# Patient Record
Sex: FEMALE | URBAN - METROPOLITAN AREA
[De-identification: names, ages, dates, MRNs, and addresses within clinical notes are randomized per-mention and may not be internally consistent; named-entity substitution may affect disease eponyms.]

---

## 2021-08-03 ENCOUNTER — VIRTUAL VISIT (OUTPATIENT)
Dept: PEDIATRIC NEUROLOGY | Facility: CLINIC | Age: 21
End: 2021-08-03
Attending: GENETIC COUNSELOR, MS
Payer: COMMERCIAL

## 2021-08-03 DIAGNOSIS — Z84.89 FAMILY HISTORY OF GENETIC DISEASE: Primary | ICD-10-CM

## 2021-08-03 DIAGNOSIS — Z82.0 FAMILY HISTORY OF CHARCOT-MARIE-TOOTH DISEASE: ICD-10-CM

## 2021-08-03 PROCEDURE — 96040 HC GENETIC COUNSELING, EACH 30 MINUTES: CPT | Mod: 95 | Performed by: GENETIC COUNSELOR, MS

## 2021-08-03 NOTE — PROGRESS NOTES
Beti is a 21 year old who is being evaluated via a billable video visit.      How would you like to obtain your AVS? Mail a copy  If the video visit is dropped, the invitation should be resent by: Send to e-mail at: [email or web address] Will anyone else be joining your video visit? No        Wilma Bowens M.A.

## 2021-09-10 NOTE — PROGRESS NOTES
Beti Leblanc was self-referred for a genetic counseling appointment given her family history of Charcot-Conchita-Tooth disease type X.    Pertinent Medical History: Beti is a 21 year old female with a history of  tripping over her own feet for the past 2 years, bad balance, ankle weakness, aching feet and ankles, and tiring of the muscles in her hands.  Joana reports that when she wears sandals it is hard to keep them on her feet.  Beti has not been evaluated by a neurologist.    Family History: A three generation pedigree was obtained today and scanned into the EMR. This family history is by patient report only and has not been verified with medical records except where noted. The following information is significant:   Beti has 1 brother named Jovanni Leblanc (age 19) who was found to have a likely pathogenic variant in the GJB1 gene in 2018 at the [facility] diagnostic laboratory.  This variant is called C.467T >G.  This confirms his diagnosis of CMTX.  Beti has 1 sister ( age 18) who is healthy and does not have symptoms of CMT.  Beti's mother has CMTX.  Christophes mother has 1 sister who has high arches, foot drop, and hand weakness that are all associated with CMT.  She has 1 son who has symptoms of CMT, 1 son who does not have symptoms of CMT, and 3 daughters who do not have symptoms of CMT.  And his mother has 1 brother who does not have symptoms of CMT and has 2 sons who do not have symptoms of CMT.  Beti has maternal grandfather has symptoms of CMT.  He has 1 brother who has CMT, 1 sister who does not have CMT, and one sister named Janeth Ramirez who has CMT.  Janeth has 1 son named Yoan Ramirez and 1 daughter named Dorothea STEPHANIENikky who is currently undergoing genetic testing for CMT. Christophes father has a history of high blood pressure but is otherwise healthy.  He has 1 sister who is alive and well.  Christophes father has 2 paternal half-brothers who are alive and well.  Their children are  also healthy.  Beti's paternal cousins are alive and well.  He has paternal grandfather has a history of high blood pressure but is otherwise healthy.  Beti's paternal grandmother has a history of high blood pressure but is otherwise healthy.  Beti's ancestry is [place].  Consanguinity was denied.    Discussion: Beti's family have a condition called Charcot Conchita Tooth Type 1X or CMT1X. This diagnosis has/has not been confirmed through genetic testing. CMT type 1 damages the myelin that coats our nerves. CMT type 1 can be diagnosed based on the results of the nerve conduction studies, family history and/or genetic testing. The subtype of CMT identified in the family is type X. This can only be determined through genetic testing. All individuals with CMT1X have a similar genetic change that causes this particular subtype.    CMT1X is the second most common type of CMT, accounting for 10% of all genetically defined CMT. CMT1X is a peripheral neuropathy that causes damage to the myelin in two types of the nerves, the motor nerves (involved in muscle movement) and the sensory nerves (involved in sensation or feeling). Damage to motor nerves causes muscle weakness and difficulty with muscle movement, especially in the hands and feet. This can lead to difficulty walking, writing, putting on jewelry and many other types of movement. Weakness in the small muscles of the foot can also lead to changes in the structure of the foot such as hammer toes or high/low arches. Damage to the sensory nerves can cause numbness, tingling and impaired balance, which can lead to fatigue.    CMT1X is highly variable within members of the same family. Individuals may not experience any symptoms, these symptoms may be mild, or they might begin as mild and gradually become more severe. Boys and men with CMTX generally develop the symptoms of CMTX between the ages of 5 and 20 and most develop symptoms prior to age 10. However, about  20% of men with this form of CMT have a later onset of symptoms. Unlike other type of CMT, individuals may experience weakness in their intrinsic hand muscles, especially their thenar (thumb) muscles, as their first symptom. Muscle weakness may be especially prominent in their thumbs and distal calf muscles.Additionally, there are many reports of stroke-like episodes that occur in men with CMTX prior to age 20. These episodes may involve inability to speak and/or muscle weakness on one side of the body which generally resolves without lasting damage but may reoccur. Almost all females with CMTX have atypical nerve conduction study results but some may never develop symptoms of this condition. Many females with CMTX have more mild symptoms that what is seen in males. In some studies, as many as 1/3 of females with CMTX have a more severe presentation and their symptoms progress similarly to males with this condition. Genetic testing cannot predict how mild or severe an individual's symptoms will be.    We reviewed the genetics and inheritance of CMTX. We all have DNA in every cell in our bodies that tells our bodies how to develop and function properly. Individual instructions in the DNA are called genes. Disease-causing variants (changes) in genes that stop the gene from working properly cause genetic diseases like CMTX.   DNA is stored in structures called chromosomes. We all have 46 chromosomes that come in 23 pairs. The first 22 pairs of chromosomes are called autosomal and are the same in males and females. The 23rd pair of chromosomes are called sex chromosomes and are different in males and females. Male sex chromosomes are XY while female sex chromosomes are XX.   The gene associated with CMTX is called GJB1 which is located on the X chromosome. Normally, this gene provides the instructions for building a protein called connexin 32. This protein plays an important role in the peripheral nervous system,  "although this role is not well understood.  Disease-causing changes in the GJB1 gene leads to damage in the myelin part of the nerves. This results in the symptoms of CMTX.    CMTX Inheritance:   Because women have two copies of the X chromosome, they have two copies of the GJB1 gene. Men only have one X chromosome so men only have one copy of the GJB1 gene. It is thought that women are most often either unaffected or has mild symptoms associated with CMTX because they have two copies of this gene and the body \"turns off\" the broken or changed copy. On the other hand, boys and men with CMTX from a change in the GJB1 gene develop symptoms because they do not have an extra copy of this gene.    For males with CMTX, they will pass the GJB1 gene change to all of their daughters and none of their sons. This means that all of their daughters will have CMTX and none of their sons will have this condition.    For females who are CMTX carriers, there is a 50% chance of passing on the GJB1 change in each pregnancy. Any daughter or son would have a 50% chance of having CMTX.    Genetic testing is available to any family member who would like to know if they are affected with this condition. Family members may contact me at [phone] with questions or to schedule an appointment.    Genetic testing:  Risks benefits and limitations of genetic testing were reviewed.  We discussed that Beti's genetic testing would only look for the specific genetic change that was found in her brother and other family members or any other disease causing changes in the GJB1 gene.  We discussed possible results for this testing including a positive, negative or uncertain result.  Beti expressed an excellent understanding of this information and decided to proceed with testing through [facility] no charge family follow-up program.    Plan:  1.   Genetic testing through [facility] no charge family follow-up program for the GJB1 gene change " identified in her brother.  A cheek swab kit will be sent to Beti at her [facility].  2. Return pending results of above testing  3. Contact information was provided should any questions arise in the future.     Milagro Fraga MS [facility]  Genetic Counselor  Division of Genetics and Metabolism  (p) [phone]  (f) [phone]     Total time spent in consultation with the family was approximately 36 minutes    Cc: No Letter

## 2021-10-07 ENCOUNTER — TELEPHONE (OUTPATIENT)
Dept: NEUROLOGY | Facility: CLINIC | Age: 21
End: 2021-10-07

## 2021-10-07 DIAGNOSIS — Z82.0 FAMILY HISTORY OF CHARCOT-MARIE-TOOTH DISEASE: ICD-10-CM

## 2021-10-07 DIAGNOSIS — Z84.89 FAMILY HISTORY OF GENETIC DISEASE: Primary | ICD-10-CM

## 2021-10-07 NOTE — TELEPHONE ENCOUNTER
Contacted Beti but was unable to leave .    I will contact her again in the future to review the results of her genetic testing.  When I speak with her I will explain that her genetic testing for the familial GJ B1 genetic change is negative or normal.  Based on this test results I do not expect her to have CMTX or symptoms of CMTX.  This testing is good but it is not perfect.  For this reason, if Beti begins to develop symptoms of this condition she should discuss this with her doctor or neurologist.  A copy of her test report will be sent in the mail.    Spoke with Beti and reviewed the information above. Beti did not have questions at this time.    Milagro Fraga MS [facility]  Genetic Counselor  Division of Genetics and Metabolism  p) [phone]  (f) [phone]